# Patient Record
(demographics unavailable — no encounter records)

---

## 2024-12-17 NOTE — HEALTH RISK ASSESSMENT
[Patient reported mammogram was normal] : Patient reported mammogram was normal [Patient reported PAP Smear was normal] : Patient reported PAP Smear was normal [Patient declined colonoscopy] : Patient declined colonoscopy [MammogramDate] : 02/2024 [MammogramComments] : post op changes s/p L lumpectomy for DCIS, radiation, Tamoxifen [PapSmearDate] : 11/2023 [PapSmearComments] : WNL [ColonoscopyComments] : does Cologuard, but is scheduled for colonoscopy in May 2025

## 2024-12-17 NOTE — PHYSICAL EXAM
[General Appearance - Alert] : alert [General Appearance - In No Acute Distress] : in no acute distress [General Appearance - Well-Appearing] : healthy appearing [Sclera] : the sclera and conjunctiva were normal [PERRL With Normal Accommodation] : pupils were equal in size, round, and reactive to light [Extraocular Movements] : extraocular movements were intact [Outer Ear] : the ears and nose were normal in appearance [Examination Of The Oral Cavity] : the lips and gums were normal [Both Tympanic Membranes Were Examined] : both tympanic membranes were normal [Oropharynx] : the oropharynx was normal [Nasal Cavity] : the nasal mucosa and septum were normal [Neck Appearance] : the appearance of the neck was normal [Neck Cervical Mass (___cm)] : no neck mass was observed [Auscultation Breath Sounds / Voice Sounds] : lungs were clear to auscultation bilaterally [Heart Rate And Rhythm] : heart rate was normal and rhythm regular [Heart Sounds] : normal S1 and S2 [Heart Sounds Gallop] : no gallops [Murmurs] : no murmurs [Heart Sounds Pericardial Friction Rub] : no pericardial rub [Bowel Sounds] : normal bowel sounds [Abdomen Soft] : soft [Abdomen Tenderness] : non-tender [] : no hepato-splenomegaly [Cervical Lymph Nodes Enlarged Posterior Bilaterally] : posterior cervical [Abdomen Mass (___ Cm)] : no abdominal mass palpated [Cervical Lymph Nodes Enlarged Anterior Bilaterally] : anterior cervical [Supraclavicular Lymph Nodes Enlarged Bilaterally] : supraclavicular [Axillary Lymph Nodes Enlarged Bilaterally] : axillary [Femoral Lymph Nodes Enlarged Bilaterally] : femoral

## 2024-12-17 NOTE — REASON FOR VISIT
[Follow-Up] : a follow-up visit [FreeTextEntry1] : [facility] Certified Condition: DCIS

## 2024-12-17 NOTE — REASON FOR VISIT
[Follow-Up] : a follow-up visit [FreeTextEntry1] : [facility] Certified Condition: DCIS

## 2024-12-17 NOTE — PHYSICAL EXAM
[General Appearance - Alert] : alert [General Appearance - In No Acute Distress] : in no acute distress [General Appearance - Well-Appearing] : healthy appearing [Sclera] : the sclera and conjunctiva were normal [PERRL With Normal Accommodation] : pupils were equal in size, round, and reactive to light [Extraocular Movements] : extraocular movements were intact [Outer Ear] : the ears and nose were normal in appearance [Examination Of The Oral Cavity] : the lips and gums were normal [Both Tympanic Membranes Were Examined] : both tympanic membranes were normal [Nasal Cavity] : the nasal mucosa and septum were normal [Oropharynx] : the oropharynx was normal [Neck Appearance] : the appearance of the neck was normal [Neck Cervical Mass (___cm)] : no neck mass was observed [Auscultation Breath Sounds / Voice Sounds] : lungs were clear to auscultation bilaterally [Heart Rate And Rhythm] : heart rate was normal and rhythm regular [Heart Sounds] : normal S1 and S2 [Heart Sounds Gallop] : no gallops [Murmurs] : no murmurs [Heart Sounds Pericardial Friction Rub] : no pericardial rub [Bowel Sounds] : normal bowel sounds [Abdomen Soft] : soft [Abdomen Tenderness] : non-tender [] : no hepato-splenomegaly [Abdomen Mass (___ Cm)] : no abdominal mass palpated [Cervical Lymph Nodes Enlarged Posterior Bilaterally] : posterior cervical [Cervical Lymph Nodes Enlarged Anterior Bilaterally] : anterior cervical [Supraclavicular Lymph Nodes Enlarged Bilaterally] : supraclavicular [Axillary Lymph Nodes Enlarged Bilaterally] : axillary [Femoral Lymph Nodes Enlarged Bilaterally] : femoral

## 2024-12-17 NOTE — PHYSICAL EXAM
[General Appearance - Alert] : alert [General Appearance - In No Acute Distress] : in no acute distress [General Appearance - Well-Appearing] : healthy appearing [Sclera] : the sclera and conjunctiva were normal [PERRL With Normal Accommodation] : pupils were equal in size, round, and reactive to light [Extraocular Movements] : extraocular movements were intact [Outer Ear] : the ears and nose were normal in appearance [Examination Of The Oral Cavity] : the lips and gums were normal [Both Tympanic Membranes Were Examined] : both tympanic membranes were normal [Oropharynx] : the oropharynx was normal [Nasal Cavity] : the nasal mucosa and septum were normal [Neck Appearance] : the appearance of the neck was normal [Neck Cervical Mass (___cm)] : no neck mass was observed [Auscultation Breath Sounds / Voice Sounds] : lungs were clear to auscultation bilaterally [Heart Rate And Rhythm] : heart rate was normal and rhythm regular [Heart Sounds] : normal S1 and S2 [Heart Sounds Gallop] : no gallops [Murmurs] : no murmurs [Heart Sounds Pericardial Friction Rub] : no pericardial rub [Bowel Sounds] : normal bowel sounds [Abdomen Soft] : soft [Abdomen Tenderness] : non-tender [] : no hepato-splenomegaly [Abdomen Mass (___ Cm)] : no abdominal mass palpated [Cervical Lymph Nodes Enlarged Posterior Bilaterally] : posterior cervical [Cervical Lymph Nodes Enlarged Anterior Bilaterally] : anterior cervical [Supraclavicular Lymph Nodes Enlarged Bilaterally] : supraclavicular [Axillary Lymph Nodes Enlarged Bilaterally] : axillary [Femoral Lymph Nodes Enlarged Bilaterally] : femoral

## 2024-12-17 NOTE — DISCUSSION/SUMMARY
[Patient seen for WTC Monitoring ___] : Patient was seen for WTC monitoring [unfilled] [Please See Note in Chart and Documentation in Trial DB] : Please see note in chart and documentation in Trial DB. [FreeTextEntry3] : 55 y.o. F here for annual [facility] monitoring visit, v2. Pt currently with HTN, DM, and Breast CA now completed Tamoxifen.    PCP: Dr. Fischer [place] exposure: [place] to [place] on [place] and was there for 10 days, 9 hours per day. 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   PMH/SHx: DM, HTN, breast cancer s/p double mastectomy 03/2016 and radiation, Carpal tunnel, bone spurs Meds: Metoprolol, metformin, Losartan, Januvia. Cholecalciferol, Amlodipine, Glimepiride, Cyclopenzaprine All: NKDA FHx:  Social: quit 17 years ago, smoked 0.5 PPD for 20 years.   ROS: IMAQ reviewed w/ patient  PE & VS: See trial DB and follow-up note   Spirometry: Poor effort, former smoker  Assessment/Plan: 54 yo here for annual monitoring/treatment visit V2. Certified for DCIS, s/p radiation, 5 yr tamoxifen now on annual f/u w/ oncologist. Recent imaging all neg for recurrence. Discussed nutrition, physical activity, and overall health gains of weight loss. Pt w/ HTN, DM2, former smoker (quit > 15 yrs).  -IMAQ reviewed with the patient -CBC, CMP, lipids, UA ordered -Imaging: chest imaging ordered today -Spirometry: poor effort, asymptomatic, former smoker -Influenza Vaccine: pt declines makes her sick -Colonoscopy: pt gets Cologuard, has not had a colonoscopy -Blood pressure: 116/82 on anti-HTN meds -RTC 1 year or sooner if symptoms worsen or new symptoms occur.

## 2024-12-17 NOTE — DISCUSSION/SUMMARY
[Patient seen for WTC Monitoring ___] : Patient was seen for WTC monitoring [unfilled] Dr. Patterson [Please See Note in Chart and Documentation in Trial DB] : Please see note in chart and documentation in Trial DB. [FreeTextEntry3] : 55 y.o. F here for annual [facility] monitoring visit, v2. Pt currently with HTN, DM, and Breast CA now completed Tamoxifen.    PCP: Dr. Fischer [place] exposure: Pt arrived to [place] on 9[place] and was there for 10 days, 9 hours per day. 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   PMH/SHx: DM, HTN, breast cancer s/p double mastectomy 03/2016 and radiation, Carpal tunnel, bone spurs Meds: Metoprolol, metformin, Losartan, Januvia. Cholecalciferol, Amlodipine, Glimepiride, Cyclopenzaprine All: NKDA FHx:  Social: quit 17 years ago, smoked 0.5 PPD for 20 years.   ROS: IMAQ reviewed w/ patient  PE & VS: See trial DB and follow-up note   Spirometry: Poor effort, former smoker  Assessment/Plan: 54 yo here for annual monitoring/treatment visit V2. Certified for DCIS, s/p radiation, 5 yr tamoxifen now on annual f/u w/ oncologist. Recent imaging all neg for recurrence. Discussed nutrition, physical activity, and overall health gains of weight loss. Pt w/ HTN, DM2, former smoker (quit > 15 yrs).  -IMAQ reviewed with the patient -CBC, CMP, lipids, UA ordered -Imaging: chest imaging ordered today -Spirometry: poor effort, asymptomatic, former smoker -Influenza Vaccine: pt declines makes her sick -Colonoscopy: pt gets Cologuard, has not had a colonoscopy -Blood pressure: 116/82 on anti-HTN meds -RTC 1 year or sooner if symptoms worsen or new symptoms occur.

## 2024-12-17 NOTE — REASON FOR VISIT
[Follow-Up] : a follow-up visit [FreeTextEntry1] : [facility] Certified Condition: DCIS

## 2024-12-17 NOTE — DISCUSSION/SUMMARY
[Patient seen for WTC Monitoring ___] : Patient was seen for WTC monitoring [unfilled] [Please See Note in Chart and Documentation in Trial DB] : Please see note in chart and documentation in Trial DB. [FreeTextEntry3] : 55 y.o. F here for annual [facility] monitoring visit, v2. Pt currently with HTN, DM, and Breast CA now completed Tamoxifen.    PCP: Dr. Fischer [place] exposure: Pt arrived to [place] on 9[place]01 and was there for 10 days, 9 hours per day. 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   PMH/SHx: DM, HTN, breast cancer s/p double mastectomy 03/2016 and radiation, Carpal tunnel, bone spurs Meds: Metoprolol, metformin, Losartan, Januvia. Cholecalciferol, Amlodipine, Glimepiride, Cyclopenzaprine All: NKDA FHx:  Social: quit 17 years ago, smoked 0.5 PPD for 20 years.   ROS: IMAQ reviewed w/ patient  PE & VS: See trial DB and follow-up note   Spirometry: Poor effort, former smoker  Assessment/Plan: 54 yo here for annual monitoring/treatment visit V2. Certified for DCIS, s/p radiation, 5 yr tamoxifen now on annual f/u w/ oncologist. Recent imaging all neg for recurrence. Discussed nutrition, physical activity, and overall health gains of weight loss. Pt w/ HTN, DM2, former smoker (quit > 15 yrs).  -IMAQ reviewed with the patient -CBC, CMP, lipids, UA ordered -Imaging: chest imaging ordered today -Spirometry: poor effort, asymptomatic, former smoker -Influenza Vaccine: pt declines makes her sick -Colonoscopy: pt gets Cologuard, has not had a colonoscopy -Blood pressure: 116/82 on anti-HTN meds -RTC 1 year or sooner if symptoms worsen or new symptoms occur.

## 2024-12-17 NOTE — DISCUSSION/SUMMARY
[Patient seen for WTC Monitoring ___] : Patient was seen for WTC monitoring [unfilled] [Please See Note in Chart and Documentation in Trial DB] : Please see note in chart and documentation in Trial DB. [FreeTextEntry3] : 55 y.o. F here for annual [facility] monitoring visit, v2. Pt currently with HTN, DM, and Breast CA now completed Tamoxifen.    PCP: Dr. Fischer [place] exposure: Pt arrived to [place] on 9[place]12[place]01 and was there for 10 days, 9 hours per day. 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   PMH/SHx: DM, HTN, breast cancer s/p double mastectomy 03/2016 and radiation, Carpal tunnel, bone spurs Meds: Metoprolol, metformin, Losartan, Januvia. Cholecalciferol, Amlodipine, Glimepiride, Cyclopenzaprine All: NKDA FHx:  Social: quit 17 years ago, smoked 0.5 PPD for 20 years.   ROS: IMAQ reviewed w/ patient  PE & VS: See trial DB and follow-up note   Spirometry: Poor effort, former smoker  Assessment/Plan: 56 yo here for annual monitoring/treatment visit V2. Certified for DCIS, s/p radiation, 5 yr tamoxifen now on annual f/u w/ oncologist. Recent imaging all neg for recurrence. Discussed nutrition, physical activity, and overall health gains of weight loss. Pt w/ HTN, DM2, former smoker (quit > 15 yrs).  -IMAQ reviewed with the patient -CBC, CMP, lipids, UA ordered -Imaging: chest imaging ordered today -Spirometry: poor effort, asymptomatic, former smoker -Influenza Vaccine: pt declines makes her sick -Colonoscopy: pt gets Cologuard, has not had a colonoscopy -Blood pressure: 116/82 on anti-HTN meds -RTC 1 year or sooner if symptoms worsen or new symptoms occur.

## 2024-12-17 NOTE — REASON FOR VISIT
[Follow-Up] : a follow-up visit [FreeTextEntry1] : [facility] Certified Condition: DCIS

## 2024-12-20 NOTE — HISTORY OF PRESENT ILLNESS
[FreeTextEntry1] : Pt here for follow-up of wtc-certified conditions: intraductal carcinoma in situ.   WT Conditions: DCIS  DCIS:  Had left breast bx 01/2016 which showed DCIS in two places, elected to have L  lumpetctomy of 2 lumps 03/2016 and underwent radiation. Has a diagnostic visit annually after taking tamoxifen for 5 years and completely bi-annually. She has had two MRI of breast and no findings of recurrance.